# Patient Record
Sex: FEMALE | Race: WHITE | NOT HISPANIC OR LATINO | URBAN - METROPOLITAN AREA
[De-identification: names, ages, dates, MRNs, and addresses within clinical notes are randomized per-mention and may not be internally consistent; named-entity substitution may affect disease eponyms.]

---

## 2023-08-09 ENCOUNTER — LAB (OUTPATIENT)
Dept: LAB | Facility: LAB | Age: 78
End: 2023-08-09
Payer: MEDICARE

## 2023-08-09 ENCOUNTER — OFFICE VISIT (OUTPATIENT)
Dept: PRIMARY CARE | Facility: CLINIC | Age: 78
End: 2023-08-09
Payer: MEDICARE

## 2023-08-09 VITALS
SYSTOLIC BLOOD PRESSURE: 115 MMHG | DIASTOLIC BLOOD PRESSURE: 76 MMHG | HEART RATE: 65 BPM | BODY MASS INDEX: 27.5 KG/M2 | HEIGHT: 58 IN | OXYGEN SATURATION: 94 % | WEIGHT: 131 LBS

## 2023-08-09 DIAGNOSIS — I10 BENIGN ESSENTIAL HYPERTENSION: ICD-10-CM

## 2023-08-09 DIAGNOSIS — E78.2 MIXED HYPERLIPIDEMIA: ICD-10-CM

## 2023-08-09 DIAGNOSIS — H40.9 GLAUCOMA, UNSPECIFIED GLAUCOMA TYPE, UNSPECIFIED LATERALITY: ICD-10-CM

## 2023-08-09 DIAGNOSIS — I10 BENIGN ESSENTIAL HYPERTENSION: Primary | ICD-10-CM

## 2023-08-09 DIAGNOSIS — E55.9 VITAMIN D DEFICIENCY: ICD-10-CM

## 2023-08-09 DIAGNOSIS — Z12.31 ENCOUNTER FOR SCREENING MAMMOGRAM FOR MALIGNANT NEOPLASM OF BREAST: ICD-10-CM

## 2023-08-09 DIAGNOSIS — M81.0 AGE-RELATED OSTEOPOROSIS WITHOUT CURRENT PATHOLOGICAL FRACTURE: ICD-10-CM

## 2023-08-09 LAB
BASOPHILS (10*3/UL) IN BLOOD BY AUTOMATED COUNT: 0.05 X10E9/L (ref 0–0.1)
BASOPHILS/100 LEUKOCYTES IN BLOOD BY AUTOMATED COUNT: 0.6 % (ref 0–2)
EOSINOPHILS (10*3/UL) IN BLOOD BY AUTOMATED COUNT: 0.12 X10E9/L (ref 0–0.4)
EOSINOPHILS/100 LEUKOCYTES IN BLOOD BY AUTOMATED COUNT: 1.6 % (ref 0–6)
ERYTHROCYTE DISTRIBUTION WIDTH (RATIO) BY AUTOMATED COUNT: 14.8 % (ref 11.5–14.5)
ERYTHROCYTE MEAN CORPUSCULAR HEMOGLOBIN CONCENTRATION (G/DL) BY AUTOMATED: 31.5 G/DL (ref 32–36)
ERYTHROCYTE MEAN CORPUSCULAR VOLUME (FL) BY AUTOMATED COUNT: 94 FL (ref 80–100)
ERYTHROCYTES (10*6/UL) IN BLOOD BY AUTOMATED COUNT: 5.04 X10E12/L (ref 4–5.2)
HEMATOCRIT (%) IN BLOOD BY AUTOMATED COUNT: 47.3 % (ref 36–46)
HEMOGLOBIN (G/DL) IN BLOOD: 14.9 G/DL (ref 12–16)
IMMATURE GRANULOCYTES/100 LEUKOCYTES IN BLOOD BY AUTOMATED COUNT: 0.4 % (ref 0–0.9)
LEUKOCYTES (10*3/UL) IN BLOOD BY AUTOMATED COUNT: 7.7 X10E9/L (ref 4.4–11.3)
LYMPHOCYTES (10*3/UL) IN BLOOD BY AUTOMATED COUNT: 1.97 X10E9/L (ref 0.8–3)
LYMPHOCYTES/100 LEUKOCYTES IN BLOOD BY AUTOMATED COUNT: 25.5 % (ref 13–44)
MONOCYTES (10*3/UL) IN BLOOD BY AUTOMATED COUNT: 0.48 X10E9/L (ref 0.05–0.8)
MONOCYTES/100 LEUKOCYTES IN BLOOD BY AUTOMATED COUNT: 6.2 % (ref 2–10)
NEUTROPHILS (10*3/UL) IN BLOOD BY AUTOMATED COUNT: 5.09 X10E9/L (ref 1.6–5.5)
NEUTROPHILS/100 LEUKOCYTES IN BLOOD BY AUTOMATED COUNT: 65.7 % (ref 40–80)
NRBC (PER 100 WBCS) BY AUTOMATED COUNT: 0 /100 WBC (ref 0–0)
PLATELETS (10*3/UL) IN BLOOD AUTOMATED COUNT: 258 X10E9/L (ref 150–450)

## 2023-08-09 PROCEDURE — 99204 OFFICE O/P NEW MOD 45 MIN: CPT | Performed by: FAMILY MEDICINE

## 2023-08-09 PROCEDURE — 3078F DIAST BP <80 MM HG: CPT | Performed by: FAMILY MEDICINE

## 2023-08-09 PROCEDURE — 36415 COLL VENOUS BLD VENIPUNCTURE: CPT

## 2023-08-09 PROCEDURE — 85025 COMPLETE CBC W/AUTO DIFF WBC: CPT

## 2023-08-09 PROCEDURE — 1159F MED LIST DOCD IN RCRD: CPT | Performed by: FAMILY MEDICINE

## 2023-08-09 PROCEDURE — 80053 COMPREHEN METABOLIC PANEL: CPT

## 2023-08-09 PROCEDURE — 82306 VITAMIN D 25 HYDROXY: CPT

## 2023-08-09 PROCEDURE — 80061 LIPID PANEL: CPT

## 2023-08-09 PROCEDURE — 84443 ASSAY THYROID STIM HORMONE: CPT

## 2023-08-09 PROCEDURE — 3074F SYST BP LT 130 MM HG: CPT | Performed by: FAMILY MEDICINE

## 2023-08-09 PROCEDURE — 1036F TOBACCO NON-USER: CPT | Performed by: FAMILY MEDICINE

## 2023-08-09 PROCEDURE — 1125F AMNT PAIN NOTED PAIN PRSNT: CPT | Performed by: FAMILY MEDICINE

## 2023-08-09 RX ORDER — LISINOPRIL AND HYDROCHLOROTHIAZIDE 10; 12.5 MG/1; MG/1
1 TABLET ORAL DAILY
COMMUNITY
End: 2023-10-03 | Stop reason: SDUPTHER

## 2023-08-09 RX ORDER — TIMOLOL MALEATE 5 MG/ML
1 SOLUTION OPHTHALMIC DAILY
COMMUNITY
Start: 2023-04-04

## 2023-08-09 RX ORDER — ASPIRIN 81 MG/1
81 TABLET ORAL DAILY
COMMUNITY
Start: 2023-06-05

## 2023-08-09 RX ORDER — LOPERAMIDE HCL 2 MG
2 TABLET ORAL 4 TIMES DAILY PRN
COMMUNITY

## 2023-08-09 RX ORDER — BRIMONIDINE TARTRATE 2 MG/ML
1 SOLUTION/ DROPS OPHTHALMIC 2 TIMES DAILY
COMMUNITY
Start: 2023-07-26

## 2023-08-09 RX ORDER — LATANOPROST 50 UG/ML
1 SOLUTION/ DROPS OPHTHALMIC DAILY
COMMUNITY

## 2023-08-09 RX ORDER — SIMVASTATIN 40 MG/1
40 TABLET, FILM COATED ORAL NIGHTLY
COMMUNITY

## 2023-08-09 RX ORDER — MULTIVITAMIN
0.4 TABLET ORAL DAILY
COMMUNITY

## 2023-08-09 ASSESSMENT — PATIENT HEALTH QUESTIONNAIRE - PHQ9
9. THOUGHTS THAT YOU WOULD BE BETTER OFF DEAD, OR OF HURTING YOURSELF: NOT AT ALL
10. IF YOU CHECKED OFF ANY PROBLEMS, HOW DIFFICULT HAVE THESE PROBLEMS MADE IT FOR YOU TO DO YOUR WORK, TAKE CARE OF THINGS AT HOME, OR GET ALONG WITH OTHER PEOPLE: SOMEWHAT DIFFICULT
SUM OF ALL RESPONSES TO PHQ QUESTIONS 1-9: 3
4. FEELING TIRED OR HAVING LITTLE ENERGY: NOT AT ALL
3. TROUBLE FALLING OR STAYING ASLEEP OR SLEEPING TOO MUCH: SEVERAL DAYS
SUM OF ALL RESPONSES TO PHQ9 QUESTIONS 1 AND 2: 1
5. POOR APPETITE OR OVEREATING: NOT AT ALL
2. FEELING DOWN, DEPRESSED OR HOPELESS: SEVERAL DAYS
7. TROUBLE CONCENTRATING ON THINGS, SUCH AS READING THE NEWSPAPER OR WATCHING TELEVISION: SEVERAL DAYS
1. LITTLE INTEREST OR PLEASURE IN DOING THINGS: NOT AT ALL
6. FEELING BAD ABOUT YOURSELF - OR THAT YOU ARE A FAILURE OR HAVE LET YOURSELF OR YOUR FAMILY DOWN: NOT AT ALL
8. MOVING OR SPEAKING SO SLOWLY THAT OTHER PEOPLE COULD HAVE NOTICED. OR THE OPPOSITE, BEING SO FIGETY OR RESTLESS THAT YOU HAVE BEEN MOVING AROUND A LOT MORE THAN USUAL: NOT AT ALL

## 2023-08-09 NOTE — PROGRESS NOTES
Subjective   Patient ID: Aida Brown is a 77 y.o. female [id number] who presents to establish care.     HPI   Patient is here to establish care.  Patient recently moved here from [place]  Now living in [place] with her 3 son and grandkids.    HTN:  Pt is taking lisinopril/ hydrochlorothiazide 10/12.5 MG DAILY medication as prescribed. Denies any side effects. patient denies any headaches, blurred vision or dizziness. patient denies any Chest pain or SOB.   BP normal.    Patient taking zocor 40 mg daily denies any side effect.    History of osteoporosis patient was on Fosamax stopped taking it as she did not like the side effect.  Patient was planned to do start Prolia.  Patient have not started it yet.    History of cholecystectomy 1988  Glaucoma patient is following eye doctor Dr. Burns.  Taking multiple eyedrops.    Need mammogram.    Patient has history of uterine mass.  Getting ultrasound yearly.  No records for review yet.  Patient lost 20 pounds in last 1-1 and half year.  Eating 2-3 meals a day.  Denies any night sweats.        PMH - HTN, HLD, Osteoprosis    PSH - Gall bladder remova;        Soc HX - No smoking, alcohol or drug use.    Vitals:    08/09/23 1500   BP: 115/76   Pulse: 65   SpO2: 94%        General Appearance:  Alert, cooperative, no distress,   Head:  Normocephalic, atraumatic   Eyes:  PERRL, conjunctiva/corneas clear, EOM's intact,    Lungs:   Clear to auscultation bilaterally, respirations unlabored   Heart:  Regular rate and rhythm, S1 and S2 normal, no murmur,    Abdomen:   Soft, non-tender, bowel sounds active all four quadrants,  no masses, no organomegaly   Extremities: Extremities normal, No edema   Neurologic: Normal        Assessment/Plan   Diagnoses and all orders for this visit:  Benign essential hypertension  -     CBC and Auto Differential; Future  -     Comprehensive Metabolic Panel; Future  -     TSH with reflex to Free T4 if abnormal; Future  Mixed hyperlipidemia  -      Lipid Panel; Future  Glaucoma, unspecified glaucoma type, unspecified laterality  Encounter for screening mammogram for malignant neoplasm of breast  -     BI mammo bilateral screening tomosynthesis; Future  Age-related osteoporosis without current pathological fracture  Vitamin D deficiency  -     Vitamin D, Total; Future    10 continue lisinopril hydrochlorothiazide  Will get basic blood work  Check TSH  Check vitamin D    Hyperlipidemia continue Zocor 40 daily  Check lipid panel    Glaucoma patient following eye doctor lesion  Continue eyedrops per ophtho    Get mammogram  Patient was advised to get all records from [place]      F/ 3 weeks

## 2023-08-10 LAB
ALANINE AMINOTRANSFERASE (SGPT) (U/L) IN SER/PLAS: 9 U/L (ref 7–45)
ALBUMIN (G/DL) IN SER/PLAS: 4.2 G/DL (ref 3.4–5)
ALKALINE PHOSPHATASE (U/L) IN SER/PLAS: 80 U/L (ref 33–136)
ANION GAP IN SER/PLAS: 15 MMOL/L (ref 10–20)
ASPARTATE AMINOTRANSFERASE (SGOT) (U/L) IN SER/PLAS: 19 U/L (ref 9–39)
BILIRUBIN TOTAL (MG/DL) IN SER/PLAS: 0.9 MG/DL (ref 0–1.2)
CALCIDIOL (25 OH VITAMIN D3) (NG/ML) IN SER/PLAS: 37 NG/ML
CALCIUM (MG/DL) IN SER/PLAS: 10 MG/DL (ref 8.6–10.6)
CARBON DIOXIDE, TOTAL (MMOL/L) IN SER/PLAS: 30 MMOL/L (ref 21–32)
CHLORIDE (MMOL/L) IN SER/PLAS: 101 MMOL/L (ref 98–107)
CHOLESTEROL (MG/DL) IN SER/PLAS: 226 MG/DL (ref 0–199)
CHOLESTEROL IN HDL (MG/DL) IN SER/PLAS: 63.4 MG/DL
CHOLESTEROL/HDL RATIO: 3.6
CREATININE (MG/DL) IN SER/PLAS: 0.63 MG/DL (ref 0.5–1.05)
GFR FEMALE: >90 ML/MIN/1.73M2
GLUCOSE (MG/DL) IN SER/PLAS: 80 MG/DL (ref 74–99)
LDL: 143 MG/DL (ref 0–99)
POTASSIUM (MMOL/L) IN SER/PLAS: 4 MMOL/L (ref 3.5–5.3)
PROTEIN TOTAL: 6.6 G/DL (ref 6.4–8.2)
SODIUM (MMOL/L) IN SER/PLAS: 142 MMOL/L (ref 136–145)
THYROTROPIN (MIU/L) IN SER/PLAS BY DETECTION LIMIT <= 0.05 MIU/L: 1.58 MIU/L (ref 0.44–3.98)
TRIGLYCERIDE (MG/DL) IN SER/PLAS: 96 MG/DL (ref 0–149)
UREA NITROGEN (MG/DL) IN SER/PLAS: 17 MG/DL (ref 6–23)
VLDL: 19 MG/DL (ref 0–40)

## 2023-08-17 ENCOUNTER — OFFICE VISIT (OUTPATIENT)
Dept: PRIMARY CARE | Facility: CLINIC | Age: 78
End: 2023-08-17
Payer: MEDICARE

## 2023-08-17 VITALS
BODY MASS INDEX: 27.29 KG/M2 | DIASTOLIC BLOOD PRESSURE: 71 MMHG | SYSTOLIC BLOOD PRESSURE: 135 MMHG | WEIGHT: 130 LBS | HEIGHT: 58 IN | OXYGEN SATURATION: 95 % | HEART RATE: 60 BPM

## 2023-08-17 DIAGNOSIS — E78.2 MIXED HYPERLIPIDEMIA: ICD-10-CM

## 2023-08-17 DIAGNOSIS — H40.9 GLAUCOMA, UNSPECIFIED GLAUCOMA TYPE, UNSPECIFIED LATERALITY: Primary | ICD-10-CM

## 2023-08-17 DIAGNOSIS — H57.9 EYE PRESSURE: ICD-10-CM

## 2023-08-17 PROCEDURE — 1036F TOBACCO NON-USER: CPT | Performed by: FAMILY MEDICINE

## 2023-08-17 PROCEDURE — 3075F SYST BP GE 130 - 139MM HG: CPT | Performed by: FAMILY MEDICINE

## 2023-08-17 PROCEDURE — 1125F AMNT PAIN NOTED PAIN PRSNT: CPT | Performed by: FAMILY MEDICINE

## 2023-08-17 PROCEDURE — 3078F DIAST BP <80 MM HG: CPT | Performed by: FAMILY MEDICINE

## 2023-08-17 PROCEDURE — 1159F MED LIST DOCD IN RCRD: CPT | Performed by: FAMILY MEDICINE

## 2023-08-17 PROCEDURE — 99213 OFFICE O/P EST LOW 20 MIN: CPT | Performed by: FAMILY MEDICINE

## 2023-08-17 RX ORDER — DORZOLAMIDE HYDROCHLORIDE AND TIMOLOL MALEATE 20; 5 MG/ML; MG/ML
1 SOLUTION/ DROPS OPHTHALMIC 2 TIMES DAILY
COMMUNITY
Start: 2023-08-17

## 2023-08-17 NOTE — PROGRESS NOTES
Subjective   Patient ID: Aida Brown [id number] is a 78 y.o. female who presents for Pressure Behind the Eyes (Been taking eye drops for glocoma for past few weeks and feeling pressure in head, on 8/13 while sleeping felt a pop sensation on right side behind eye. worried about other things it could be. Stopped taking cholesterol med while taking eye drops ).    HPI   And is here for concern for pressure behind the eye.  Patient is using eyedrop for glaucoma for past few week.  Patient was feeling pressure in head last night and heard a pop.  Patient had foggy bleeding for 15 seconds.  Currently denies any headache dizziness chest pain shortness of breath.  No blurry vision no double vision no floaters.  She is normal.  Denied tingling numbness weakness.  Patient is not a smoker no family history of aneurysm.  No personal history of aneurysm.    Has pressure behind the eye going to see eye doctor in  4 days.  Appointment scheduled for Monday.    Lab results discussed with the patient.  Elevated cholesterol informed.  Patient is on simvastatin.  Was not taking it every day as prescribed.  elevated LDL.  Patient was advised to restart simvastatin daily basis        Social History     Tobacco Use    Smoking status: Never    Smokeless tobacco: Never   Vaping Use    Vaping Use: Never used   Substance Use Topics    Alcohol use: Yes     Comment: occassionally    Drug use: Never       No Known Allergies    Current Outpatient Medications   Medication Sig Dispense Refill    aspirin 81 mg EC tablet Take 1 tablet (81 mg) by mouth once daily.      brimonidine (AlphaGAN P) 0.2 % ophthalmic solution Administer 1 drop into both eyes 2 times a day.      dorzolamide-timoloL (Cosopt) 22.3-6.8 mg/mL ophthalmic solution Administer 1 drop into the left eye 2 times a day.      folic acid/multivit-min/lutein (CENTRUM SILVER ORAL) Take by mouth once daily.      latanoprost (Xalatan) 0.005 % ophthalmic solution Administer 1 drop into  "both eyes once daily.      lisinopriL-hydrochlorothiazide 10-12.5 mg tablet Take 1 tablet by mouth once daily.      loperamide (Imodium A-D) 2 mg tablet Take 1 tablet (2 mg) by mouth 4 times a day as needed for diarrhea.      multivit with min-folic acid 0.4 mg tablet Take 0.4 mg by mouth once daily.      simvastatin (Zocor) 40 mg tablet Take 1 tablet (40 mg) by mouth once daily at bedtime.      timolol (Timoptic-XR) 0.5 % ophthalmic gel-forming Administer 1 drop into both eyes once daily.      vit C,A-Qq-vqcvu-lutein-zeaxan 250-90-40-1 mg tablet,chewable Chew once daily.       No current facility-administered medications for this visit.       Physical Exam  /71   Pulse 60   Ht 1.473 m (4' 10\")   Wt 59 kg (130 lb)   SpO2 95%   BMI 27.17 kg/m²   General    Alert, oriented x 3 ,cooperative, no distress,    Head:    Normocephalic, without obvious abnormality, atraumatic   Eyes:    PERRL, conjunctiva/corneas clear, EOM's intact,    Neck:   Supple, symmetrical, No enlargement   Back:     Symmetric, no curvature, ROM normal, no CVA tenderness   Lungs:     Clear to auscultation bilaterally, respirations normal ,no wheezing or ronchi   Heart:    Regular rate and rhythm, S1 and S2 normal, no murmur, rub or gallop   Abdomen:     Soft, non-tender, bowel sounds active all four quadrants, no masses,   no organomegaly   Lymph nodes:  SKIN   Cervical Lymphnodes normal  Normal color , no rashes   Neurologic:   Gait normal strength, sensation normal, CN II-XII normal. Reflexes normal.         Lab on 08/09/2023   Component Date Value Ref Range Status    WBC 08/09/2023 7.7  4.4 - 11.3 x10E9/L Final    nRBC 08/09/2023 0.0  0.0 - 0.0 /100 WBC Final    RBC 08/09/2023 5.04  4.00 - 5.20 x10E12/L Final    Hemoglobin 08/09/2023 14.9  12.0 - 16.0 g/dL Final    Hematocrit 08/09/2023 47.3 (H)  36.0 - 46.0 % Final    MCV 08/09/2023 94  80 - 100 fL Final    MCHC 08/09/2023 31.5 (L)  32.0 - 36.0 g/dL Final    Platelets 08/09/2023 258  " 150 - 450 x10E9/L Final    RDW 08/09/2023 14.8 (H)  11.5 - 14.5 % Final    Neutrophils % 08/09/2023 65.7  40.0 - 80.0 % Final    Immature Granulocytes %, Automated 08/09/2023 0.4  0.0 - 0.9 % Final     Immature Granulocyte Count (IG) includes promyelocytes,    myelocytes and metamyelocytes but does not include bands.   Percent differential counts (%) should be interpreted in the   context of the absolute cell counts (cells/L).    Lymphocytes % 08/09/2023 25.5  13.0 - 44.0 % Final    Monocytes % 08/09/2023 6.2  2.0 - 10.0 % Final    Eosinophils % 08/09/2023 1.6  0.0 - 6.0 % Final    Basophils % 08/09/2023 0.6  0.0 - 2.0 % Final    Neutrophils Absolute 08/09/2023 5.09  1.60 - 5.50 x10E9/L Final    Lymphocytes Absolute 08/09/2023 1.97  0.80 - 3.00 x10E9/L Final    Monocytes Absolute 08/09/2023 0.48  0.05 - 0.80 x10E9/L Final    Eosinophils Absolute 08/09/2023 0.12  0.00 - 0.40 x10E9/L Final    Basophils Absolute 08/09/2023 0.05  0.00 - 0.10 x10E9/L Final    Glucose 08/09/2023 80  74 - 99 mg/dL Final    Sodium 08/09/2023 142  136 - 145 mmol/L Final    Potassium 08/09/2023 4.0  3.5 - 5.3 mmol/L Final    Chloride 08/09/2023 101  98 - 107 mmol/L Final    Bicarbonate 08/09/2023 30  21 - 32 mmol/L Final    Anion Gap 08/09/2023 15  10 - 20 mmol/L Final    Urea Nitrogen 08/09/2023 17  6 - 23 mg/dL Final    Creatinine 08/09/2023 0.63  0.50 - 1.05 mg/dL Final    GFR Female 08/09/2023 >90  >90 mL/min/1.73m2 Final     CALCULATIONS OF ESTIMATED GFR ARE PERFORMED   USING THE 2021 CKD-EPI STUDY REFIT EQUATION   WITHOUT THE RACE VARIABLE FOR THE IDMS-TRACEABLE   CREATININE METHODS.    https://jasn.asnjournals.org/content/early/2021/09/22/ASN.1778796182    Calcium 08/09/2023 10.0  8.6 - 10.6 mg/dL Final    Albumin 08/09/2023 4.2  3.4 - 5.0 g/dL Final    Alkaline Phosphatase 08/09/2023 80  33 - 136 U/L Final    Total Protein 08/09/2023 6.6  6.4 - 8.2 g/dL Final    AST 08/09/2023 19  9 - 39 U/L Final    Total Bilirubin 08/09/2023 0.9   0.0 - 1.2 mg/dL Final    ALT (SGPT) 08/09/2023 9  7 - 45 U/L Final     Patients treated with Sulfasalazine may generate    falsely decreased results for ALT.    Cholesterol 08/09/2023 226 (H)  0 - 199 mg/dL Final    .      AGE      DESIRABLE   BORDERLINE HIGH   HIGH     0-19 Y     0 - 169       170 - 199     >/= 200    20-24 Y     0 - 189       190 - 224     >/= 225         >24 Y     0 - 199       200 - 239     >/= 240   **All ranges are based on fasting samples. Specific   therapeutic targets will vary based on patient-specific   cardiac risk.  .   Pediatric guidelines reference:Pediatrics 2011, 128(S5).   Adult guidelines reference: NCEP ATPIII Guidelines,     JOHN 2001, 258:2486-97  .   Venipuncture immediately after or during the    administration of Metamizole may lead to falsely   low results. Testing should be performed immediately   prior to Metamizole dosing.    HDL 08/09/2023 63.4  mg/dL Final    .      AGE      VERY LOW   LOW     NORMAL    HIGH       0-19 Y       < 35   < 40     40-45     ----    20-24 Y       ----   < 40       >45     ----      >24 Y       ----   < 40     40-60      >60  .    Cholesterol/HDL Ratio 08/09/2023 3.6   Final    REF VALUES  DESIRABLE  < 3.4  HIGH RISK  > 5.0    LDL 08/09/2023 143 (H)  0 - 99 mg/dL Final    .                           NEAR      BORD      AGE      DESIRABLE  OPTIMAL    HIGH     HIGH     VERY HIGH     0-19 Y     0 - 109     ---    110-129   >/= 130     ----    20-24 Y     0 - 119     ---    120-159   >/= 160     ----      >24 Y     0 -  99   100-129  130-159   160-189     >/=190  .    VLDL 08/09/2023 19  0 - 40 mg/dL Final    Triglycerides 08/09/2023 96  0 - 149 mg/dL Final    .      AGE      DESIRABLE   BORDERLINE HIGH   HIGH     VERY HIGH   0 D-90 D    19 - 174         ----         ----        ----  91 D- 9 Y     0 -  74        75 -  99     >/= 100      ----    10-19 Y     0 -  89        90 - 129     >/= 130      ----    20-24 Y     0 - 114       115 - 149      >/= 150      ----         >24 Y     0 - 149       150 - 199    200- 499    >/= 500  .   Venipuncture immediately after or during the    administration of Metamizole may lead to falsely   low results. Testing should be performed immediately   prior to Metamizole dosing.    Vitamin D, 25-Hydroxy 08/09/2023 37  ng/mL Final    .  DEFICIENCY:         < 20   NG/ML  INSUFFICIENCY:      20-29  NG/ML  SUFFICIENCY:         NG/ML    THIS ASSAY ACCURATELY QUANTIFIES THE SUM OF  VITAMIN D3, 25-HYDROXY AND VIT D2,25-HYDROXY.    TSH 08/09/2023 1.58  0.44 - 3.98 mIU/L Final     TSH testing is performed using different testing    methodology at [facility] than at other    [facility]. Direct result comparisons should    only be made within the same method.       Assessment/Plan   Aida was seen today for pressure behind the eyes.  Diagnoses and all orders for this visit:  Glaucoma, unspecified glaucoma type, unspecified laterality (Primary)  Eye pressure  Mixed hyperlipidemia     Patient was advised to follow-up with eye doctor  Cranial nerve exam normal  Patient was reassured  Keep  appointment with eye doctor Monday.    Hyperlipidemia  Continue Statin daily

## 2023-08-23 ENCOUNTER — APPOINTMENT (OUTPATIENT)
Dept: PRIMARY CARE | Facility: CLINIC | Age: 78
End: 2023-08-23
Payer: MEDICARE

## 2023-10-03 ENCOUNTER — OFFICE VISIT (OUTPATIENT)
Dept: PRIMARY CARE | Facility: CLINIC | Age: 78
End: 2023-10-03
Payer: MEDICARE

## 2023-10-03 VITALS
HEIGHT: 58 IN | DIASTOLIC BLOOD PRESSURE: 80 MMHG | SYSTOLIC BLOOD PRESSURE: 140 MMHG | OXYGEN SATURATION: 96 % | BODY MASS INDEX: 27.5 KG/M2 | WEIGHT: 131 LBS | HEART RATE: 71 BPM

## 2023-10-03 DIAGNOSIS — I10 BENIGN ESSENTIAL HYPERTENSION: ICD-10-CM

## 2023-10-03 DIAGNOSIS — M81.0 OSTEOPOROSIS, UNSPECIFIED OSTEOPOROSIS TYPE, UNSPECIFIED PATHOLOGICAL FRACTURE PRESENCE: ICD-10-CM

## 2023-10-03 DIAGNOSIS — Z00.00 ENCOUNTER FOR ANNUAL WELLNESS EXAM IN MEDICARE PATIENT: Primary | ICD-10-CM

## 2023-10-03 DIAGNOSIS — N85.8 UTERINE CYST: ICD-10-CM

## 2023-10-03 PROCEDURE — G0439 PPPS, SUBSEQ VISIT: HCPCS | Performed by: FAMILY MEDICINE

## 2023-10-03 PROCEDURE — 1159F MED LIST DOCD IN RCRD: CPT | Performed by: FAMILY MEDICINE

## 2023-10-03 PROCEDURE — 3079F DIAST BP 80-89 MM HG: CPT | Performed by: FAMILY MEDICINE

## 2023-10-03 PROCEDURE — 99213 OFFICE O/P EST LOW 20 MIN: CPT | Performed by: FAMILY MEDICINE

## 2023-10-03 PROCEDURE — 1036F TOBACCO NON-USER: CPT | Performed by: FAMILY MEDICINE

## 2023-10-03 PROCEDURE — 3077F SYST BP >= 140 MM HG: CPT | Performed by: FAMILY MEDICINE

## 2023-10-03 PROCEDURE — 1125F AMNT PAIN NOTED PAIN PRSNT: CPT | Performed by: FAMILY MEDICINE

## 2023-10-03 RX ORDER — LISINOPRIL AND HYDROCHLOROTHIAZIDE 10; 12.5 MG/1; MG/1
1 TABLET ORAL DAILY
Qty: 90 TABLET | Refills: 1 | Status: SHIPPED | OUTPATIENT
Start: 2023-10-03 | End: 2023-10-27 | Stop reason: SDUPTHER

## 2023-10-03 ASSESSMENT — PATIENT HEALTH QUESTIONNAIRE - PHQ9
SUM OF ALL RESPONSES TO PHQ9 QUESTIONS 1 AND 2: 1
10. IF YOU CHECKED OFF ANY PROBLEMS, HOW DIFFICULT HAVE THESE PROBLEMS MADE IT FOR YOU TO DO YOUR WORK, TAKE CARE OF THINGS AT HOME, OR GET ALONG WITH OTHER PEOPLE: NOT DIFFICULT AT ALL
1. LITTLE INTEREST OR PLEASURE IN DOING THINGS: NOT AT ALL
2. FEELING DOWN, DEPRESSED OR HOPELESS: SEVERAL DAYS

## 2023-10-03 NOTE — PROGRESS NOTES
"Subjective   Patient ID: Aida Brown is a 78 y.o. female [id number] who presents to [facility].     HPI    Patient recently moved here from [place].  Here for Medicare wellness.  Now living in [place] with her 3 son and grandkids.    HTN:  Pt is taking lisinopril/ hydrochlorothiazide 10/12.5 mg daily . Denies any side effects. patient denies any headaches, blurred vision or dizziness. patient denies any Chest pain or SOB. BP normal.    Patient taking zocor 40 mg daily denies any side effect.    History of osteoporosis patient was on Fosamax stopped taking it as she did not like the side effect.  Patient was planned to do start Prolia.  Patient have not started it yet.    History of cholecystectomy 1988  Glaucoma patient is following eye doctor Dr. Burns.  Taking multiple eyedrops.    Need mammogram.    Patient has history of uterine mass.  Getting ultrasound yearly.  No records for review yet.  Patient lost 20 pounds in last 1-1 and half year.  Eating 2-3 meals a day.  Denies any night sweats.  Pt is asking  for uterine ultrasound.    PMH - HTN, HLD, Osteoprosis    PSH - Gall bladder remova;    Otherwise independent doing everything by herself.  Patient is driving no fall in last 6-month good hearing good cognition. walking without cane.    Social History     Tobacco Use    Smoking status: Never    Smokeless tobacco: Never   Vaping Use    Vaping Use: Never used   Substance Use Topics    Alcohol use: Yes     Comment: occassionally    Drug use: Never     Social History     Tobacco Use    Smoking status: Never    Smokeless tobacco: Never   Vaping Use    Vaping Use: Never used   Substance Use Topics    Alcohol use: Yes     Comment: occassionally    Drug use: Never   Soc HX - No smoking, alcohol or drug use.    Vitals:    10/03/23 1032   Pulse: 71   SpO2: 96%      Pulse 71   Ht 1.473 m (4' 10\")   Wt 59.4 kg (131 lb)   SpO2 96%   BMI 27.38 kg/m²     General Appearance:  Alert, cooperative, no distress, "   Head:  Normocephalic, atraumatic   Eyes:  PERRL, conjunctiva/corneas clear, EOM's intact,    Lungs:   Clear to auscultation bilaterally, respirations unlabored   Heart:  Regular rate and rhythm, S1 and S2 normal, no murmur,    Extremities: Extremities normal, No edema   Neurologic: Normal       Assessment/Plan   Diagnoses and all orders for this visit:  Aida was seen today for medicare annual wellness visit subsequent.  Diagnoses and all orders for this visit:  Encounter for annual wellness exam in Medicare patient (Primary)  Osteoporosis, unspecified osteoporosis type, unspecified pathological fracture presence  -     XR DEXA bone density; Future  Benign essential hypertension  -     lisinopriL-hydrochlorothiazide 10-12.5 mg tablet; Take 1 tablet by mouth once daily.  Uterine cyst  -     US pelvis transvaginal; Future      Hypertension  Hyperlipidemia  Glaucoma  Osteoporosis  Vitamin D deficiency    HTN   continue lisinopril hydrochlorothiazide    Hyperlipidemia continue Zocor 40 daily  Check lipid panel    Glaucoma patient following eye doctor lesion  Continue eyedrops per ophtho    PREVENTIVE  Get mammogram  Patient was advised to get all records from [place]  Had colonoscopy at age 73 at [facility] [place] [facility]- was advised to repeat in 10 years.   Get DEXA   Will think abt Prevnar 20 and let me know.       F/up 3 months

## 2023-10-27 ENCOUNTER — TELEMEDICINE (OUTPATIENT)
Dept: PRIMARY CARE | Facility: CLINIC | Age: 78
End: 2023-10-27
Payer: MEDICARE

## 2023-10-27 DIAGNOSIS — I10 BENIGN ESSENTIAL HYPERTENSION: ICD-10-CM

## 2023-10-27 DIAGNOSIS — D25.1 INTRAMURAL LEIOMYOMA OF UTERUS: Primary | ICD-10-CM

## 2023-10-27 DIAGNOSIS — M81.0 AGE-RELATED OSTEOPOROSIS WITHOUT CURRENT PATHOLOGICAL FRACTURE: ICD-10-CM

## 2023-10-27 PROCEDURE — 99443 PR PHYS/QHP TELEPHONE EVALUATION 21-30 MIN: CPT | Performed by: FAMILY MEDICINE

## 2023-10-27 RX ORDER — LISINOPRIL AND HYDROCHLOROTHIAZIDE 10; 12.5 MG/1; MG/1
1 TABLET ORAL DAILY
Qty: 90 TABLET | Refills: 1 | Status: SHIPPED | OUTPATIENT
Start: 2023-10-27

## 2023-10-27 NOTE — PROGRESS NOTES
This visit was completed via VIRTUAL VIDEO/Audio due to the restrictions of the COVID-19 pandemic. All issues as below were discussed and addressed but no physical exam was performed. If it was felt that the patient should be evaluated in clinic then they were directed there. The patient verbally consented to visit.      Patient ID: Aida Brown [id number] is a 78 y.o. female who presents for Results (DISCUSS DEXA SCAN RESULTS).    HPI   Following for test result.  Transvaginal ultrasound with intramural fibroid around 1.2 to 1.5 cm/4 mm uterine polyp.  Patient currently denies any pelvic pain any vaginal bleeding no abdominal discomfort or spotting.  Patient is getting ultrasound every year for last 8-year for the same.  Patient was reassured for stable findings.    Osteoporosis : Patient was getting DEXA scan in [place].  Was told to start Prolia in [place].  Patient moved to [place] so never started Prolia.  Patient has history of GERD with esophageal stricture requiring dilation.  Patient cannot take Fosamax.  Okay to consider Prolia shot.    Labs Reviewed from last visit    HTN:  patient needs refill on lisinopril hydrochlorothiazide.  Tolerating it well.  Denies any side effect      Social History     Tobacco Use    Smoking status: Never    Smokeless tobacco: Never   Vaping Use    Vaping Use: Never used   Substance Use Topics    Alcohol use: Yes     Comment: occassionally    Drug use: Never       No Known Allergies    Current Outpatient Medications   Medication Sig Dispense Refill    aspirin 81 mg EC tablet Take 1 tablet (81 mg) by mouth once daily.      brimonidine (AlphaGAN P) 0.2 % ophthalmic solution Administer 1 drop into both eyes 2 times a day.      dorzolamide-timoloL (Cosopt) 22.3-6.8 mg/mL ophthalmic solution Administer 1 drop into the left eye 2 times a day.      folic acid/multivit-min/lutein (CENTRUM SILVER ORAL) Take by mouth once daily.      latanoprost (Xalatan) 0.005 % ophthalmic  solution Administer 1 drop into both eyes once daily.      lisinopriL-hydrochlorothiazide 10-12.5 mg tablet Take 1 tablet by mouth once daily. 90 tablet 1    loperamide (Imodium A-D) 2 mg tablet Take 1 tablet (2 mg) by mouth 4 times a day as needed for diarrhea.      multivit with min-folic acid 0.4 mg tablet Take 0.4 mg by mouth once daily.      simvastatin (Zocor) 40 mg tablet Take 1 tablet (40 mg) by mouth once daily at bedtime.      vit C,P-Vh-gsnjl-lutein-zeaxan 250-90-40-1 mg tablet,chewable Chew once daily.      timolol (Timoptic-XR) 0.5 % ophthalmic gel-forming Administer 1 drop into both eyes once daily.       No current facility-administered medications for this visit.       There were no vitals taken for this visit.      Assessment/Plan   Diagnoses and all orders for this visit:  Intramural leiomyoma of uterus  Benign essential hypertension  -     lisinopriL-hydrochlorothiazide 10-12.5 mg tablet; Take 1 tablet by mouth once daily.  Age-related osteoporosis without current pathological fracture  -     denosumab (Prolia) 60 mg/mL syringe; Inject 1 mL (60 mg total) under the skin every 6 months.    F/up 1 month

## 2023-10-30 ENCOUNTER — APPOINTMENT (OUTPATIENT)
Dept: PRIMARY CARE | Facility: CLINIC | Age: 78
End: 2023-10-30
Payer: MEDICARE

## 2023-11-08 ENCOUNTER — TELEPHONE (OUTPATIENT)
Dept: PRIMARY CARE | Facility: CLINIC | Age: 78
End: 2023-11-08

## 2023-11-08 NOTE — TELEPHONE ENCOUNTER
Getting prolia shot. Need to call and confirm with [facility] specialty pharmacy. Arrival will be Friday.     [phone]

## 2023-11-10 ENCOUNTER — CLINICAL SUPPORT (OUTPATIENT)
Dept: PRIMARY CARE | Facility: CLINIC | Age: 78
End: 2023-11-10
Payer: MEDICARE

## 2023-11-10 DIAGNOSIS — M81.0 AGE-RELATED OSTEOPOROSIS WITHOUT CURRENT PATHOLOGICAL FRACTURE: ICD-10-CM

## 2023-11-10 PROCEDURE — 96372 THER/PROPH/DIAG INJ SC/IM: CPT | Performed by: FAMILY MEDICINE

## 2024-02-05 ENCOUNTER — TELEPHONE (OUTPATIENT)
Dept: PRIMARY CARE | Facility: CLINIC | Age: 79
End: 2024-02-05
Payer: MEDICARE

## 2024-02-05 DIAGNOSIS — R30.9 PAINFUL URINATION: ICD-10-CM

## 2024-02-05 DIAGNOSIS — M54.50 LOW BACK PAIN WITHOUT SCIATICA, UNSPECIFIED BACK PAIN LATERALITY, UNSPECIFIED CHRONICITY: ICD-10-CM

## 2024-02-05 NOTE — TELEPHONE ENCOUNTER
Patient called stated she got a call saying she was due for her pelvic ultrasound. There is no order and looks like she might've had this done in October 2023. Please advise. She did say she was having some lower side discomfort and burning.

## 2024-02-12 ENCOUNTER — LAB (OUTPATIENT)
Dept: LAB | Facility: LAB | Age: 79
End: 2024-02-12
Payer: MEDICARE

## 2024-02-12 DIAGNOSIS — M54.50 LOW BACK PAIN WITHOUT SCIATICA, UNSPECIFIED BACK PAIN LATERALITY, UNSPECIFIED CHRONICITY: ICD-10-CM

## 2024-02-12 DIAGNOSIS — R30.9 PAINFUL URINATION: ICD-10-CM

## 2024-02-12 LAB
APPEARANCE UR: CLEAR
BILIRUB UR STRIP.AUTO-MCNC: NEGATIVE MG/DL
COLOR UR: YELLOW
GLUCOSE UR STRIP.AUTO-MCNC: NORMAL MG/DL
KETONES UR STRIP.AUTO-MCNC: NEGATIVE MG/DL
LEUKOCYTE ESTERASE UR QL STRIP.AUTO: NEGATIVE
NITRITE UR QL STRIP.AUTO: NEGATIVE
PH UR STRIP.AUTO: 5 [PH]
PROT UR STRIP.AUTO-MCNC: NEGATIVE MG/DL
RBC # UR STRIP.AUTO: NEGATIVE /UL
SP GR UR STRIP.AUTO: 1.02
UROBILINOGEN UR STRIP.AUTO-MCNC: NORMAL MG/DL

## 2024-02-12 PROCEDURE — 81003 URINALYSIS AUTO W/O SCOPE: CPT

## 2024-02-12 PROCEDURE — 87086 URINE CULTURE/COLONY COUNT: CPT

## 2024-02-13 LAB — BACTERIA UR CULT: NORMAL

## 2024-03-05 ENCOUNTER — TELEPHONE (OUTPATIENT)
Dept: PRIMARY CARE | Facility: CLINIC | Age: 79
End: 2024-03-05
Payer: MEDICARE

## 2024-03-05 DIAGNOSIS — M81.0 AGE-RELATED OSTEOPOROSIS WITHOUT CURRENT PATHOLOGICAL FRACTURE: ICD-10-CM

## 2024-03-26 ENCOUNTER — TELEPHONE (OUTPATIENT)
Dept: PRIMARY CARE | Facility: CLINIC | Age: 79
End: 2024-03-26
Payer: MEDICARE

## 2024-03-26 NOTE — TELEPHONE ENCOUNTER
PT is not wanting the Prolia injections.  She feels her back is hurting and her jaw hurts.    Is there another alternative to these injections.     Her next injection would be April 16th.

## 2024-03-29 NOTE — TELEPHONE ENCOUNTER
Patient called back regarding this. Stated there was another medication she wanted to try instead of the prolia injections, the name on the box was Actneo but she is unsure if this is correct. Please call back.

## 2024-04-05 ENCOUNTER — TELEMEDICINE (OUTPATIENT)
Dept: PRIMARY CARE | Facility: CLINIC | Age: 79
End: 2024-04-05
Payer: MEDICARE

## 2024-04-05 DIAGNOSIS — M81.0 AGE-RELATED OSTEOPOROSIS WITHOUT CURRENT PATHOLOGICAL FRACTURE: Primary | ICD-10-CM

## 2024-04-05 PROCEDURE — 99212 OFFICE O/P EST SF 10 MIN: CPT | Performed by: FAMILY MEDICINE

## 2024-04-05 RX ORDER — IBANDRONATE SODIUM 150 MG/1
150 TABLET, FILM COATED ORAL
Qty: 1 TABLET | Refills: 3 | Status: SHIPPED | OUTPATIENT
Start: 2024-04-05 | End: 2025-04-05

## 2024-04-05 NOTE — PROGRESS NOTES
This visit was completed via VIRTUAL VIDEO/Audio due to the restrictions of the COVID-19 pandemic. All issues as below were discussed and addressed but no physical exam was performed. If it was felt that the patient should be evaluated in clinic then they were directed there. The patient verbally consented to visit.      Patient ID: Aida Brown [id number] is a 78 y.o. female who presents for Follow-up (Medication questions).    HPI   Patient was started on Prolia for osteoporosis.  Patient was noticing increasing low back pain and jaw crackling with Prolia.  Patient does not want to take Prolia.  Tried Fosamax in past more than 20 years ago.  Had some GI side effect.  Option for Forteo and zoledronic acid discussed with the patient.  Patient was advised to see rheumatology for further management.  Patient want to hold off seeing rheumatology at this point want to try bisphosphonate 1 more time.  As it is more cost effective.    Social History     Tobacco Use    Smoking status: Never    Smokeless tobacco: Never   Vaping Use    Vaping Use: Never used   Substance Use Topics    Alcohol use: Yes     Comment: occassionally    Drug use: Never       No Known Allergies    Current Outpatient Medications   Medication Sig Dispense Refill    aspirin 81 mg EC tablet Take 1 tablet (81 mg) by mouth once daily.      brimonidine (AlphaGAN P) 0.2 % ophthalmic solution Administer 1 drop into both eyes 2 times a day.      denosumab (Prolia) 60 mg/mL syringe Inject 1 mL (60 mg total) under the skin every 6 months. 1 mL 1    dorzolamide-timoloL (Cosopt) 22.3-6.8 mg/mL ophthalmic solution Administer 1 drop into the left eye 2 times a day.      folic acid/multivit-min/lutein (CENTRUM SILVER ORAL) Take by mouth once daily.      latanoprost (Xalatan) 0.005 % ophthalmic solution Administer 1 drop into both eyes once daily.      lisinopriL-hydrochlorothiazide 10-12.5 mg tablet Take 1 tablet by mouth once daily. 90 tablet 1    loperamide  (Imodium A-D) 2 mg tablet Take 1 tablet (2 mg) by mouth 4 times a day as needed for diarrhea.      multivit with min-folic acid 0.4 mg tablet Take 0.4 mg by mouth once daily.      simvastatin (Zocor) 40 mg tablet Take 1 tablet (40 mg) by mouth once daily at bedtime.      timolol (Timoptic-XR) 0.5 % ophthalmic gel-forming Administer 1 drop into both eyes once daily.      vit C,X-Ys-zkqsu-lutein-zeaxan 250-90-40-1 mg tablet,chewable Chew once daily.       Current Facility-Administered Medications   Medication Dose Route Frequency Provider Last Rate Last Admin    denosumab (Prolia) injection 60 mg  60 mg subcutaneous q6 months Aurora Montoya MD   60 mg at 11/10/23 1144       There were no vitals taken for this visit.      Assessment/Plan   Diagnoses and all orders for this visit:  Age-related osteoporosis without current pathological fracture  -     Referral to Rheumatology; Future  -     ibandronate (Boniva) 150 mg tablet; Take 1 tablet (150 mg) by mouth every 30 (thirty) days. Take in morning with full glass of water on an empty stomach. No food, drink, meds, or lying down for 60 minutes after.    Will start ibandronate 150 mg once a month.

## 2024-04-16 ENCOUNTER — APPOINTMENT (OUTPATIENT)
Dept: PRIMARY CARE | Facility: CLINIC | Age: 79
End: 2024-04-16
Payer: MEDICARE

## 2024-06-11 ENCOUNTER — APPOINTMENT (OUTPATIENT)
Dept: RHEUMATOLOGY | Facility: CLINIC | Age: 79
End: 2024-06-11
Payer: MEDICARE

## 2024-06-24 DIAGNOSIS — M81.0 AGE-RELATED OSTEOPOROSIS WITHOUT CURRENT PATHOLOGICAL FRACTURE: ICD-10-CM

## 2024-06-24 RX ORDER — IBANDRONATE SODIUM 150 MG/1
150 TABLET, FILM COATED ORAL
Qty: 3 TABLET | Refills: 1 | Status: SHIPPED | OUTPATIENT
Start: 2024-06-24

## 2024-08-20 ENCOUNTER — APPOINTMENT (OUTPATIENT)
Dept: RHEUMATOLOGY | Facility: CLINIC | Age: 79
End: 2024-08-20
Payer: MEDICARE

## 2024-08-20 VITALS
SYSTOLIC BLOOD PRESSURE: 158 MMHG | WEIGHT: 134 LBS | HEART RATE: 65 BPM | DIASTOLIC BLOOD PRESSURE: 81 MMHG | HEIGHT: 57 IN | BODY MASS INDEX: 28.91 KG/M2

## 2024-08-20 DIAGNOSIS — M81.0 AGE-RELATED OSTEOPOROSIS WITHOUT CURRENT PATHOLOGICAL FRACTURE: Primary | ICD-10-CM

## 2024-08-20 LAB
NON-UH HIE A/G RATIO: 1.5
NON-UH HIE ALB: 4.1 G/DL (ref 3.4–5)
NON-UH HIE ALK PHOS: 63 UNIT/L (ref 45–117)
NON-UH HIE BILIRUBIN, TOTAL: 0.7 MG/DL (ref 0.3–1.2)
NON-UH HIE BUN/CREAT RATIO: 14.3
NON-UH HIE BUN: 10 MG/DL (ref 9–23)
NON-UH HIE C-REACTIVE PROTEIN, QUANTITATIVE: <0.4 MG/DL (ref 0–0.9)
NON-UH HIE CALCIUM: 9.5 MG/DL (ref 8.7–10.4)
NON-UH HIE CALCULATED OSMOLALITY: 279 MOSM/KG (ref 275–295)
NON-UH HIE CHLORIDE: 105 MMOL/L (ref 98–107)
NON-UH HIE CO2, VENOUS: 29 MMOL/L (ref 20–31)
NON-UH HIE CREATININE: 0.7 MG/DL (ref 0.5–0.8)
NON-UH HIE GFR AA: >60
NON-UH HIE GLOBULIN: 2.8 G/DL
NON-UH HIE GLOMERULAR FILTRATION RATE: >60 ML/MIN/1.73M?
NON-UH HIE GLUCOSE: 77 MG/DL (ref 74–106)
NON-UH HIE GOT: 27 UNIT/L (ref 15–37)
NON-UH HIE GPT: 14 UNIT/L (ref 10–49)
NON-UH HIE K: 4.4 MMOL/L (ref 3.5–5.1)
NON-UH HIE NA: 141 MMOL/L (ref 135–145)
NON-UH HIE SED RATE WESTERGREN: 13 MM/HR (ref 0–30)
NON-UH HIE TOTAL PROTEIN: 6.9 G/DL (ref 5.7–8.2)
NON-UH HIE VIT D 25: 40 NG/ML

## 2024-08-20 PROCEDURE — 1126F AMNT PAIN NOTED NONE PRSNT: CPT | Performed by: INTERNAL MEDICINE

## 2024-08-20 PROCEDURE — 99204 OFFICE O/P NEW MOD 45 MIN: CPT | Performed by: INTERNAL MEDICINE

## 2024-08-20 PROCEDURE — 3077F SYST BP >= 140 MM HG: CPT | Performed by: INTERNAL MEDICINE

## 2024-08-20 PROCEDURE — 1036F TOBACCO NON-USER: CPT | Performed by: INTERNAL MEDICINE

## 2024-08-20 PROCEDURE — 1160F RVW MEDS BY RX/DR IN RCRD: CPT | Performed by: INTERNAL MEDICINE

## 2024-08-20 PROCEDURE — 3079F DIAST BP 80-89 MM HG: CPT | Performed by: INTERNAL MEDICINE

## 2024-08-20 PROCEDURE — 1159F MED LIST DOCD IN RCRD: CPT | Performed by: INTERNAL MEDICINE

## 2024-08-20 ASSESSMENT — PAIN SCALES - GENERAL: PAINLEVEL: 0-NO PAIN

## 2024-08-20 NOTE — PROGRESS NOTES
Subjective   Patient ID: Aida Brown is a 79 y.o. female who presents for New Patient Visit (New patient visit).    HPI  80 yo female with osteoporosis  She tried Prolia and got one shot and she had hair loss and skin bumps.  Then, she stopped Prolia, she is using Boniva once a month  Boniva was started 5 months ago.  She had ankle fracture many years ago  She used Fosamax many years ago and used it 6 months and she had GI problem and stopped it. She used it early 2000s  She is using calcium and vitamin D  She does not have any GI problem now.  She does not have any dental issue  She has glaucoma and Ophthalmology is following her  She reports back pain and knee pain at right site  Family h/o did not reveal any fracture  10/23 DEXA  AP spine  T score -3.4  Femoral neck  T -3.4  10 year risk:  25% for major fracture  11% for hip fracture  ROS  Joint pain in hands: negative   Joint swelling: negative  Morning stiffness and duration: negative   strength: normal  Oral ulcer: negative  Genital ulcer: negative  Raynaud phenomenon: negative  Chest pain/dyspnea: negative  Low back pain: negative  Visual problem: negative  Dry eyes/dry mouth: negative  Skin rash/scaling/psoriasis: negative       Objective     PEXAM  VS reviewed, WNL  General: Alert, no distress   HEENT: Normocephalic/atraumatic, No alopecia. PERRLA. Sclera white, conjunctiva pink, no malar rash. no oral or nasal ulcer. Oral cavity pink and moist, no erythema or exudate, dentition good.   Neck: supple  Respiratory: CTA B, no adventitious breath sounds  Cardiac: RRR, no murmurs, carotid, or bruits  Abdominal: symmetrical, soft, non-tender, non-distended, normoactive BSx4 quadrants, no CVA tenderness or suprapubic tenderness  MSK: Joints of upper and lower extremities were assessed for synovitis and ROM.    Today she has no evidence of synovitis in the joints of her hands or wrists, tender joint count 0, swollen joint count 0   Extremities: no  clubbing, no cyanosis, no edema  Skin: Skin warm and moist.   Neuro: non-focal, Strength 5/5 throughout. Normal gait. No cerebellar pathologic exam     Assessment/Plan   78 yo female with osteoporosis  Her lowes T score is -3.4  She is using Boniva for 4 months  She used Fosamax many years ago and she had GERD  She received one Prolia injection 6 months ago, but she had hair loss and skin bumps. Also insurance requested high copayment.  No h/I fracture except remote ankle fracture.  Discussed with her and decided to start Zoledronic acid infusion  -will check her blood works  -will continue calcium, vit D and regular exercise  -will see her once a year  -repeat dEXA in 2025

## 2024-08-26 ENCOUNTER — SPECIALTY PHARMACY (OUTPATIENT)
Dept: PHARMACY | Facility: CLINIC | Age: 79
End: 2024-08-26

## 2024-08-26 ENCOUNTER — DOCUMENTATION (OUTPATIENT)
Dept: INFUSION THERAPY | Facility: CLINIC | Age: 79
End: 2024-08-26
Payer: MEDICARE

## 2024-08-26 DIAGNOSIS — M81.0 AGE-RELATED OSTEOPOROSIS WITHOUT CURRENT PATHOLOGICAL FRACTURE: Primary | ICD-10-CM

## 2024-08-26 LAB — NON-UH HIE MISC SENDOUT: NORMAL

## 2024-08-26 RX ORDER — ALBUTEROL SULFATE 0.83 MG/ML
3 SOLUTION RESPIRATORY (INHALATION) AS NEEDED
OUTPATIENT
Start: 2024-09-09

## 2024-08-26 RX ORDER — DIPHENHYDRAMINE HYDROCHLORIDE 50 MG/ML
50 INJECTION INTRAMUSCULAR; INTRAVENOUS AS NEEDED
OUTPATIENT
Start: 2024-09-09

## 2024-08-26 RX ORDER — ZOLEDRONIC ACID 5 MG/100ML
INJECTION, SOLUTION INTRAVENOUS
Qty: 100 ML | Refills: 0 | OUTPATIENT
Start: 2024-08-26

## 2024-08-26 RX ORDER — ZOLEDRONIC ACID 5 MG/100ML
5 INJECTION, SOLUTION INTRAVENOUS ONCE
OUTPATIENT
Start: 2024-09-09

## 2024-08-26 RX ORDER — EPINEPHRINE 0.3 MG/.3ML
0.3 INJECTION SUBCUTANEOUS EVERY 5 MIN PRN
OUTPATIENT
Start: 2024-09-09

## 2024-08-26 RX ORDER — FAMOTIDINE 10 MG/ML
20 INJECTION INTRAVENOUS ONCE AS NEEDED
OUTPATIENT
Start: 2024-09-09

## 2024-08-26 NOTE — PROGRESS NOTES
New start Reclast infusions - updated therapy plan and smart set sent to the [facility] location

## 2024-08-26 NOTE — PROGRESS NOTES
CLINICAL CLEARANCE FOR OUTPATIENT INFUSION      Patient to be scheduled for  New Start of Reclast infusions  For Diagnosis: Osteoporosis     Labs… (must be within 28 days of scheduled infusion)  Lab Results   Component Value Date    CREATININE 0.63 08/09/2023    There were no vitals filed for this visit.  CrCl: 63.537 (hold / contact prescribing provider if <35ml/min)     Corrected Calcium: 9.42 (Hold/ contact prescribing provider if <8.6 mg/dL)  Lab Results   Component Value Date    CALCIUM 10.0 08/09/2023      Lab Results   Component Value Date    ALBUMIN 4.2 08/09/2023        Calcium and Vitamin D supplement noted on medication list? No  (if no nurse to encourage discussion of supplementation at visit)  Current Outpatient Medications on File Prior to Visit   Medication Sig Dispense Refill    aspirin 81 mg EC tablet Take 1 tablet (81 mg) by mouth once daily.      brimonidine (AlphaGAN P) 0.2 % ophthalmic solution Administer 1 drop into both eyes 2 times a day.      dorzolamide-timoloL (Cosopt) 22.3-6.8 mg/mL ophthalmic solution Administer 1 drop into the left eye 2 times a day.      folic acid/multivit-min/lutein (CENTRUM SILVER ORAL) Take by mouth once daily.      ibandronate (Boniva) 150 mg tablet Take 1 tablet (150 mg) by mouth every 30 (thirty) days. Take in morning with full glass of water on an empty stomach. No food, drink, meds, or lying down for 60 minutes after. 3 tablet 1    latanoprost (Xalatan) 0.005 % ophthalmic solution Administer 1 drop into both eyes once daily.      loperamide (Imodium A-D) 2 mg tablet Take 1 tablet (2 mg) by mouth 4 times a day as needed for diarrhea.      multivit with min-folic acid 0.4 mg tablet Take 0.4 mg by mouth once daily.      simvastatin (Zocor) 40 mg tablet Take 1 tablet (40 mg) by mouth once daily at bedtime.      timolol (Timoptic-XR) 0.5 % ophthalmic gel-forming Administer 1 drop into both eyes once daily.      vit C,U-Ud-fngaj-lutein-zeaxan 250-90-40-1 mg  tablet,chewable Chew once daily.      zoledronic acid (Reclast) 5 mg/100 mL piggyback Infuse 100mg IV once every year 100 mL 0    [DISCONTINUED] denosumab (Prolia) 60 mg/mL syringe Inject 1 mL (60 mg total) under the skin every 6 months. 1 mL 1    [DISCONTINUED] lisinopriL-hydrochlorothiazide 10-12.5 mg tablet Take 1 tablet by mouth once daily. 90 tablet 1     Current Facility-Administered Medications on File Prior to Visit   Medication Dose Route Frequency Provider Last Rate Last Admin    [DISCONTINUED] denosumab (Prolia) injection 60 mg  60 mg subcutaneous q6 months Aurora Montoya MD   60 mg at 11/10/23 4555        Is the patient receiving or have an allergy to Zometa? No  No Known Allergies     No obvious recent dental work per chart review. Nurse to confirm no dental within past/next 4 weeks at encounter.    Urine Hcg test ordered prior to first infusion? No (If female pt <60 years of age and with reproductive capability)  (If no please order)    Last infusion received: N/A (if continuation)    Due: Anytime    Labs drawn no more than 28 days prior to their scheduled appointment    Okay to schedule for treatment as ordered by prescribing provider.

## 2024-09-13 ENCOUNTER — TELEPHONE (OUTPATIENT)
Dept: INFUSION THERAPY | Facility: CLINIC | Age: 79
End: 2024-09-13
Payer: MEDICARE

## 2025-01-08 ENCOUNTER — APPOINTMENT (OUTPATIENT)
Dept: PRIMARY CARE | Facility: CLINIC | Age: 80
End: 2025-01-08
Payer: MEDICARE

## 2025-01-08 VITALS
DIASTOLIC BLOOD PRESSURE: 76 MMHG | HEIGHT: 57 IN | HEART RATE: 59 BPM | OXYGEN SATURATION: 94 % | WEIGHT: 136.4 LBS | SYSTOLIC BLOOD PRESSURE: 130 MMHG | BODY MASS INDEX: 29.43 KG/M2

## 2025-01-08 DIAGNOSIS — Z12.31 SCREENING MAMMOGRAM FOR BREAST CANCER: ICD-10-CM

## 2025-01-08 DIAGNOSIS — M81.0 AGE RELATED OSTEOPOROSIS, UNSPECIFIED PATHOLOGICAL FRACTURE PRESENCE: ICD-10-CM

## 2025-01-08 DIAGNOSIS — E78.2 MIXED HYPERLIPIDEMIA: ICD-10-CM

## 2025-01-08 DIAGNOSIS — Z00.00 ENCOUNTER FOR MEDICARE ANNUAL WELLNESS EXAM: Primary | ICD-10-CM

## 2025-01-08 DIAGNOSIS — I10 BENIGN ESSENTIAL HYPERTENSION: ICD-10-CM

## 2025-01-08 DIAGNOSIS — Z23 NEED FOR VACCINATION: ICD-10-CM

## 2025-01-08 PROCEDURE — G0439 PPPS, SUBSEQ VISIT: HCPCS | Performed by: FAMILY MEDICINE

## 2025-01-08 PROCEDURE — 90677 PCV20 VACCINE IM: CPT | Performed by: FAMILY MEDICINE

## 2025-01-08 PROCEDURE — 99497 ADVNCD CARE PLAN 30 MIN: CPT | Performed by: FAMILY MEDICINE

## 2025-01-08 PROCEDURE — 1036F TOBACCO NON-USER: CPT | Performed by: FAMILY MEDICINE

## 2025-01-08 PROCEDURE — G0009 ADMIN PNEUMOCOCCAL VACCINE: HCPCS | Performed by: FAMILY MEDICINE

## 2025-01-08 PROCEDURE — 1170F FXNL STATUS ASSESSED: CPT | Performed by: FAMILY MEDICINE

## 2025-01-08 PROCEDURE — 1158F ADVNC CARE PLAN TLK DOCD: CPT | Performed by: FAMILY MEDICINE

## 2025-01-08 PROCEDURE — 1123F ACP DISCUSS/DSCN MKR DOCD: CPT | Performed by: FAMILY MEDICINE

## 2025-01-08 PROCEDURE — 99214 OFFICE O/P EST MOD 30 MIN: CPT | Performed by: FAMILY MEDICINE

## 2025-01-08 PROCEDURE — 3078F DIAST BP <80 MM HG: CPT | Performed by: FAMILY MEDICINE

## 2025-01-08 PROCEDURE — 3075F SYST BP GE 130 - 139MM HG: CPT | Performed by: FAMILY MEDICINE

## 2025-01-08 RX ORDER — SIMVASTATIN 40 MG/1
40 TABLET, FILM COATED ORAL NIGHTLY
Qty: 90 TABLET | Refills: 1 | Status: SHIPPED | OUTPATIENT
Start: 2025-01-08

## 2025-01-08 ASSESSMENT — ACTIVITIES OF DAILY LIVING (ADL)
GROCERY_SHOPPING: INDEPENDENT
DOING_HOUSEWORK: INDEPENDENT
DRESSING: INDEPENDENT
MANAGING_FINANCES: INDEPENDENT
BATHING: INDEPENDENT
TAKING_MEDICATION: INDEPENDENT

## 2025-01-08 ASSESSMENT — PATIENT HEALTH QUESTIONNAIRE - PHQ9
1. LITTLE INTEREST OR PLEASURE IN DOING THINGS: NOT AT ALL
1. LITTLE INTEREST OR PLEASURE IN DOING THINGS: NOT AT ALL
2. FEELING DOWN, DEPRESSED OR HOPELESS: NOT AT ALL
SUM OF ALL RESPONSES TO PHQ9 QUESTIONS 1 AND 2: 0
SUM OF ALL RESPONSES TO PHQ9 QUESTIONS 1 AND 2: 0
2. FEELING DOWN, DEPRESSED OR HOPELESS: NOT AT ALL

## 2025-01-08 NOTE — PROGRESS NOTES
Chief Complaint   Patient presents with    Medicare Annual Wellness Visit Subsequent     Medicare wellness         HPI     Here for Medicare wellness. Pt was in [place] , in [place] for last 1-2 years.here after 1 year.  Now living in [place] with her 3 son and grandkids.    HTN:  Pt stop  taking lisinopril/ hydrochlorothiazide 10/12.5 mg daily. Denies any side effects. patient denies any headaches, blurred vision or dizziness. patient denies any Chest pain or SOB. BP normal.    Patient taking zocor 40 mg daily denies any side effect.    Osteoporosis patient was on Fosamax stopped taking it as she did not like the side effect.   Prolia with  skin rash with that, so stop taking it.   Pt now following Osteostrong and doing exercises and  taking Aljgae tri power Omega-3 fish oil.     History of cholecystectomy 1988  Glaucoma patient is following eye doctor Dr. Burns.  Taking multiple eyedrops.    Need mammogram.    Patient has history of uterine mass.  US pelvis with stable uterine fibroid 2023  No records for review yet.    PMH - HTN, HLD, Osteoprosis    PSH - Gall bladder remova;    Otherwise independent doing everything by herself.  Patient is driving no fall in last 6-month good hearing good cognition. walking without cane.    Social History     Tobacco Use    Smoking status: Never    Smokeless tobacco: Never   Vaping Use    Vaping status: Never Used   Substance Use Topics    Alcohol use: Yes     Comment: occassionally    Drug use: Never     Social History     Tobacco Use    Smoking status: Never    Smokeless tobacco: Never   Vaping Use    Vaping status: Never Used   Substance Use Topics    Alcohol use: Yes     Comment: occassionally    Drug use: Never   Soc HX - No smoking, alcohol or drug use.    Past Surgical History:   Procedure Laterality Date    CHOLECYSTECTOMY  1988    EYE SURGERY Left 12/17/2024     Past Medical History:   Diagnosis Date    Cataract 2017    Hypertension     S/P cholecystectomy  "1988         Vitals:    01/08/25 0902   BP: 130/76   Pulse: 59   SpO2: 94%      /76   Pulse 59   Ht 1.448 m (4' 9\")   Wt 61.9 kg (136 lb 6.4 oz)   SpO2 94%   BMI 29.52 kg/m²     General Appearance:  Alert, cooperative, no distress,   Head:  Normocephalic, atraumatic   Eyes:  PERRL, conjunctiva/corneas clear, EOM's intact,    Lungs:   Clear to auscultation bilaterally, respirations unlabored   Heart:  Regular rate and rhythm, S1 and S2 normal, no murmur,    Extremities: Extremities normal, No edema   Neurologic: Normal       Assessment/Plan   Diagnoses and all orders for this visit:  Aida was seen today for medicare annual wellness visit subsequent.  Diagnoses and all orders for this visit:  Encounter for Medicare annual wellness exam (Primary)  -     TSH with reflex to Free T4 if abnormal; Future  -     Lipid Panel; Future  -     CBC; Future  -     Urinalysis with Reflex Microscopic; Future  Age related osteoporosis, unspecified pathological fracture presence  -     XR DEXA bone density; Future  Benign essential hypertension  -     CBC; Future  Need for vaccination  -     Pneumococcal conjugate vaccine, 20-valent (PREVNAR 20)  Screening mammogram for breast cancer  -     BI mammo bilateral screening tomosynthesis; Future  Mixed hyperlipidemia  -     simvastatin (Zocor) 40 mg tablet; Take 1 tablet (40 mg) by mouth once daily at bedtime.        Hypertension  Hyperlipidemia  Glaucoma  Osteoporosis  Vitamin D deficiency    HTN  Off  lisinopril hydrochlorothiazide for 9 month  BP normal    Hyperlipidemia continue Zocor 40 daily  Check lipid panel    Glaucoma patient following eye doctor lesion  Continue eyedrops per ophtho    PREVENTIVE  Get mammogram  Patient was advised to get all records from [place]  Had colonoscopy at age 73 at [facility]- was advised to repeat in 10 years.   Get DEXA 12/2025  Prevnar 20 given      Advance Care Planning   Patient daughter Silvina Mccullough has living will and " medical power of .  Patient is full code.  Advanced care planning was discussed for 20 mins  DIscussed in details the options of advanced directives with patient in a voluntary nature. We discussed the differences between full code, comfort care arrest and comfort care. Patient was educated in detail regarding end-of-life care including options for hospice and palliative care. I provided details to patient regarding the importance of creating both a Living Will and Power of  documents and encouraged patient to complete these documents.

## 2025-07-08 ENCOUNTER — APPOINTMENT (OUTPATIENT)
Dept: PRIMARY CARE | Facility: CLINIC | Age: 80
End: 2025-07-08
Payer: MEDICARE

## 2025-07-08 VITALS
HEIGHT: 57 IN | BODY MASS INDEX: 28.48 KG/M2 | DIASTOLIC BLOOD PRESSURE: 88 MMHG | HEART RATE: 76 BPM | WEIGHT: 132 LBS | SYSTOLIC BLOOD PRESSURE: 148 MMHG

## 2025-07-08 DIAGNOSIS — I10 BENIGN ESSENTIAL HYPERTENSION: ICD-10-CM

## 2025-07-08 DIAGNOSIS — E78.2 MIXED HYPERLIPIDEMIA: Primary | ICD-10-CM

## 2025-07-08 DIAGNOSIS — M81.0 AGE RELATED OSTEOPOROSIS, UNSPECIFIED PATHOLOGICAL FRACTURE PRESENCE: ICD-10-CM

## 2025-07-08 PROCEDURE — 99214 OFFICE O/P EST MOD 30 MIN: CPT | Performed by: FAMILY MEDICINE

## 2025-07-08 PROCEDURE — 1160F RVW MEDS BY RX/DR IN RCRD: CPT | Performed by: FAMILY MEDICINE

## 2025-07-08 PROCEDURE — 3077F SYST BP >= 140 MM HG: CPT | Performed by: FAMILY MEDICINE

## 2025-07-08 PROCEDURE — 1036F TOBACCO NON-USER: CPT | Performed by: FAMILY MEDICINE

## 2025-07-08 PROCEDURE — 3079F DIAST BP 80-89 MM HG: CPT | Performed by: FAMILY MEDICINE

## 2025-07-08 PROCEDURE — 1159F MED LIST DOCD IN RCRD: CPT | Performed by: FAMILY MEDICINE

## 2025-07-08 ASSESSMENT — PATIENT HEALTH QUESTIONNAIRE - PHQ9
SUM OF ALL RESPONSES TO PHQ9 QUESTIONS 1 AND 2: 0
2. FEELING DOWN, DEPRESSED OR HOPELESS: NOT AT ALL
1. LITTLE INTEREST OR PLEASURE IN DOING THINGS: NOT AT ALL

## 2025-07-08 NOTE — PROGRESS NOTES
"    Chief Complaint   Patient presents with    Follow-up     Follow up          HPI     Pt was in [place] , in [place] for last 3 years.  Now living in [place] with her 3 son and grandkids.    HTN:  Pt stop  taking lisinopril/ hydrochlorothiazide 10/12.5 mg daily. Denies any side effects. patient denies any headaches, blurred vision or dizziness. patient denies any Chest pain or SOB. BP normal.    Patient taking zocor 40 mg daily denies any side effect.    Osteoporosis patient was on Fosamax stopped taking it as she did not like the side effect.   Prolia with  skin rash with that, so stop taking it.   Pt now following Osteostrong and doing exercises and  taking Aljgae tri power Omega-3 fish oil.     History of cholecystectomy 1988  Glaucoma patient is following eye doctor Dr. Burns.  Taking multiple eyedrops.    Going to schedule mammogram and DEXA this year in October    Patient has history of uterine mass.  US pelvis with stable uterine fibroid 2023  No records for review yet.    PMH - HTN, HLD, Osteoprosis    PSH - Cholecystectomy    Taking Lauryn D K2 , Vitamin B12 5000 Mcg, Fish oil, Magnesium, centrum women daily.    Social History     Tobacco Use    Smoking status: Never    Smokeless tobacco: Never   Vaping Use    Vaping status: Never Used   Substance Use Topics    Alcohol use: Yes     Comment: occassionally    Drug use: Never     Social History     Tobacco Use    Smoking status: Never    Smokeless tobacco: Never   Vaping Use    Vaping status: Never Used   Substance Use Topics    Alcohol use: Yes     Comment: occassionally    Drug use: Never   Soc HX - No smoking, alcohol or drug use.    Past Surgical History:   Procedure Laterality Date    CHOLECYSTECTOMY  1988    EYE SURGERY Left 12/17/2024     Past Medical History:   Diagnosis Date    Cataract 2017    Hypertension     S/P cholecystectomy 1988         There were no vitals filed for this visit.     Ht (!) 1.448 m (4' 9\")   Wt 59.9 kg (132 lb)   BMI " 28.56 kg/m²     General Appearance:  Alert, cooperative, no distress,   Head:  Normocephalic, atraumatic   Eyes:  PERRL, conjunctiva/corneas clear, EOM's intact,    Lungs:   Clear to auscultation bilaterally, respirations unlabored   Heart:  Regular rate and rhythm, S1 and S2 normal, no murmur,    Extremities: Extremities normal, No edema   Neurologic: Normal       Assessment/Plan   Aiad was seen today for follow-up.  Diagnoses and all orders for this visit:  Mixed hyperlipidemia (Primary)  Age related osteoporosis, unspecified pathological fracture presence  -     XR DEXA bone density; Future  Benign essential hypertension          Hypertension  Hyperlipidemia  Glaucoma  Osteoporosis  Vitamin D deficiency    HTN  Off  lisinopril hydrochlorothiazide for 9 month  BP normal    Hyperlipidemia continue Zocor 40 daily  Check lipid panel    Glaucoma patient following eye doctor lesion  Continue eyedrops per ophtho    PREVENTIVE  Get mammogram and DEXA  Patient was advised to get all records from [place]  Had colonoscopy at age 73 at [facility]- was advised to repeat in 10 years.   Prevnar 20 given      Advance Care Planning   Patient daughter Silvina Mccullough has living will and medical power of .  Patient is full code.      F/up 6 months

## 2026-01-02 ENCOUNTER — APPOINTMENT (OUTPATIENT)
Dept: PRIMARY CARE | Facility: CLINIC | Age: 81
End: 2026-01-02
Payer: MEDICARE